# Patient Record
Sex: FEMALE | Race: WHITE | Employment: FULL TIME | URBAN - METROPOLITAN AREA
[De-identification: names, ages, dates, MRNs, and addresses within clinical notes are randomized per-mention and may not be internally consistent; named-entity substitution may affect disease eponyms.]

---

## 2017-01-03 ENCOUNTER — TELEPHONE (OUTPATIENT)
Dept: OBGYN | Facility: CLINIC | Age: 22
End: 2017-01-03

## 2017-01-04 NOTE — TELEPHONE ENCOUNTER
Return call from patient- patient scheduled at [facility] with Herlinda Tom on 1/13/17. Patient pleased.

## 2017-01-13 ENCOUNTER — OFFICE VISIT (OUTPATIENT)
Dept: OBGYN | Facility: CLINIC | Age: 22
End: 2017-01-13
Payer: COMMERCIAL

## 2017-01-13 VITALS
WEIGHT: 138.6 LBS | SYSTOLIC BLOOD PRESSURE: 103 MMHG | HEART RATE: 81 BPM | HEIGHT: 61 IN | DIASTOLIC BLOOD PRESSURE: 64 MMHG | BODY MASS INDEX: 26.17 KG/M2 | TEMPERATURE: 98.7 F

## 2017-01-13 DIAGNOSIS — Z30.46 NEXPLANON REMOVAL: Primary | ICD-10-CM

## 2017-01-13 DIAGNOSIS — Z30.09 BIRTH CONTROL COUNSELING: ICD-10-CM

## 2017-01-13 PROCEDURE — 11982 REMOVE DRUG IMPLANT DEVICE: CPT | Performed by: NURSE PRACTITIONER

## 2017-01-13 PROCEDURE — 99201 ZZC OFFICE/OUTPT VISIT, NEW, LEVEL I: CPT | Mod: 25 | Performed by: NURSE PRACTITIONER

## 2017-01-13 RX ORDER — DESOGESTREL AND ETHINYL ESTRADIOL 0.15-0.03
1 KIT ORAL DAILY
Qty: 84 TABLET | Refills: 1 | Status: SHIPPED | OUTPATIENT
Start: 2017-01-13 | End: 2017-07-06

## 2017-01-13 ASSESSMENT — PAIN SCALES - GENERAL: PAINLEVEL: NO PAIN (0)

## 2017-01-13 NOTE — PROGRESS NOTES
"Meera Salomon is a 21 year old female  No LMP recorded. Patient has had an implant. After 3 years of Nexplanon as a contraceptive, she was here today for its removal. Would like to do something else for contraception. Patient medical, surgical, social, and family history reviewed and updated. ROS: 10 point ROS neg other than the symptoms noted above in the HPI.    Meera was counseled about removal. She voiced her understanding and informed consent was obtained.    /64 mmHg  Pulse 81  Temp(Src) 98.7  F (37.1  C) (Oral)  Ht 5' 1.25\" (1.556 m)  Wt 138 lb 9.6 oz (62.869 kg)  BMI 25.97 kg/m2   This is a well appearing female in no acute distress. Answers questions and maintains eye contact appropriately.     PROCEDURE:  Patient was placed in a supine position. Left arm was flexed at the elbow and externally rotated. The implant was located by palpation and marked at the end closest to the elbow with a sterile marker.     The area was cleaned and antiseptic applied. 1cc of 1% Lidocaine was injected just underneath the end of the implant closest to the elbow.  Light pressure was applied on the end of the implant closest to the elbow and a 2 mm incision was made in the longitudinal direction of the arm at the tip of the implant closest to the elbow. The implant was gently pushed toward the incision until the tip was visible and was grasped with sterile mosquito foreceps and gently removed.  The incision was closed with a butterfly closure and an adhesive bandage applied. A sterile gauze with pressure bandage was also applied.    Aseptic conditions were maintained throughout the procedure. The patient tolerated the procedure well.  No apparent complications. Follow up as needed.  Discussed options for contraception with patient including oral contraceptive pills, transdermal patches, vaginal ring, Depo Provera, Nexplanon. At this time she would like to try an oral contraceptive pill. We discussed when " to start the pill, taking it at the same time every day, possible side effects she may experience, and use of barrier method to protect against STDs. Patient due for AFE and pap smear. Declined completing today. Will plan to return to clinic in the next few months to complete this.    Approximately 10 minutes face to face time was spent with the patient today entirely in education and counseling regarding contraception-this is over and above time spent in the removal procedure.    Herlinda SANDOVAL CNP

## 2017-01-13 NOTE — NURSING NOTE
"Chief Complaint   Patient presents with     Other     Nexplanon removal       Initial /64 mmHg  Pulse 81  Temp(Src) 98.7  F (37.1  C) (Oral)  Ht 5' 1.25\" (1.556 m)  Wt 138 lb 9.6 oz (62.869 kg)  BMI 25.97 kg/m2 Estimated body mass index is 25.97 kg/(m^2) as calculated from the following:    Height as of this encounter: 5' 1.25\" (1.556 m).    Weight as of this encounter: 138 lb 9.6 oz (62.869 kg)..  BP completed using cuff size: regular    Consent obtained today at visit, please see scanned report.         Jennifer Powell CMA        "

## 2017-01-13 NOTE — MR AVS SNAPSHOT
"              After Visit Summary   2017    Meera Salomon    MRN: [id number]           Patient Information     Date Of Birth          1995        Visit Information        Provider Department      2017 10:00 AM Herlinda Tom APRN CNP; [facility] 2 [facility]        Today's Diagnoses     Nexplanon removal    -  1     Birth control counseling            Follow-ups after your visit        Who to contact     If you have questions or need follow up information about today's clinic visit or your schedule please contact [facility] directly at [phone].  Normal or non-critical lab and imaging results will be communicated to you by [employer], letter or phone within 4 business days after the clinic has received the results. If you do not hear from us within 7 days, please contact the clinic through [employer] or phone. If you have a critical or abnormal lab result, we will notify you by phone as soon as possible.  Submit refill requests through [email or web address] or call your pharmacy and they will forward the refill request to us. Please allow 3 business days for your refill to be completed.          Additional Information About Your Visit        MyChart Information     [email or web address] lets you send messages to your doctor, view your test results, renew your prescriptions, schedule appointments and more. To sign up, go to [email or web address] . Click on \"Log in\" on the left side of the screen, which will take you to the Welcome page. Then click on \"Sign up Now\" on the right side of the page.     You will be asked to enter the access code listed below, as well as some personal information. Please follow the directions to create your username and password.     Your access code is: [email or web address]  Expires: 2017 10:44 AM     Your access code will  in 90 days. If you need help or a new code, please call your [facility] or [phone].        Care EveryWhere ID     " "This is your Care EveryWhere ID. This could be used by other organizations to access your [place] medical records  [id number]        Your Vitals Were     Pulse Temperature Height BMI (Body Mass Index)          81 98.7  F (37.1  C) (Oral) 5' 1.25\" (1.556 m) 25.97 kg/m2         Blood Pressure from Last 3 Encounters:   01/13/17 103/64    Weight from Last 3 Encounters:   01/13/17 138 lb 9.6 oz (62.869 kg)              We Performed the Following     REMOVAL NON-BIODEGRADABLE DRUG DELIVERY IMPLANT          Today's Medication Changes          These changes are accurate as of: 1/13/17 10:44 AM.  If you have any questions, ask your nurse or doctor.               Start taking these medicines.        Dose/Directions    desogestrel-ethinyl estradiol 0.15-30 MG-MCG per tablet   Commonly known as:  APRI   Used for:  Birth control counseling   Started by:  Herlinda Tom APRN CNP        Dose:  1 tablet   Take 1 tablet by mouth daily   Quantity:  84 tablet   Refills:  1            Where to get your medicines      These medications were sent to [facility] - [place], [place] - [address]  [address] [place] [address]     Phone:  [phone]    - desogestrel-ethinyl estradiol 0.15-30 MG-MCG per tablet             Primary Care Provider    None Specified       No primary provider on file.        Thank you!     Thank you for choosing [facility]  [facility]. Our goal is always to provide you with excellent care. Hearing back from our patients is one way we can continue to improve our services. Please take a few minutes to complete the written survey that you may receive in the mail after your visit with us. Thank you!             Your Updated Medication List - Protect others around you: Learn how to safely use, store and throw away your medicines at www.disposemymeds.org.          This list is accurate as of: 1/13/17 10:44 AM.  Always use your most " recent med list.                   Brand Name Dispense Instructions for use    desogestrel-ethinyl estradiol 0.15-30 MG-MCG per tablet    APRI    84 tablet    Take 1 tablet by mouth daily       etonogestrel 68 MG Impl    IMPLANON/NEXPLANON     1 each by Subdermal route once

## 2017-07-06 DIAGNOSIS — Z30.09 BIRTH CONTROL COUNSELING: ICD-10-CM

## 2017-07-06 NOTE — TELEPHONE ENCOUNTER
desogestrel-ethinyl estradiol (APRI) 0.15-30 MG-MCG per tablet 84 tablet 1 1/13/2017  --   Sig: Take 1 tablet by mouth daily     Last Office Visit with [facility] or [facility] prescribing provider: 01-13-17 with LORI Ryan, CNP for Nexplanon removal. Notes as below:  Patient due for AFE and pap smear. Declined completing today. Will plan to return to clinic in the next few months to complete this    No future appt scheduled.                                          Patient needs to be seen by provider   Is there a time frame in which the patient should be seen Yes: as soon as possible  What does the patient need to be seen regarding WWE   Does patient need to come fasting No    When workflow completed Route to care team    Clinic: [facility] at [phone]    Was first outreach attempted?Yes: left message for patient to call back to schedule an appt.   Sruthi Hall RN, BAN

## 2017-07-06 NOTE — TELEPHONE ENCOUNTER
Reason for Call:  Medication or medication refill:    Do you use a [facility]?  Name of the pharmacy and phone number for the current request:  [facility]     Name of the medication requested: desogestrel-ethinyl estradiol (APRI) 0.15-30 MG-MCG per tablet    Other request:     Can we leave a detailed message on this number? YES    Phone number patient can be reached at: Home number on file [phone] (home)    Best Time:     Call taken on 7/6/2017 at 2:04 PM by Fern Stevens

## 2017-07-07 NOTE — TELEPHONE ENCOUNTER
Patient would like a refill on this medication until she sees you next Thursday.  She needs this by Sunday.  Please call and discuss this as she requests.

## 2017-07-10 RX ORDER — DESOGESTREL AND ETHINYL ESTRADIOL 0.15-0.03
1 KIT ORAL DAILY
Qty: 28 TABLET | Refills: 0 | Status: SHIPPED | OUTPATIENT
Start: 2017-07-10 | End: 2017-07-13

## 2017-07-10 NOTE — TELEPHONE ENCOUNTER
Prescription approved per [facility].  Left message for patient that Rx was sent to pharmacy. Verified appt details as below:  Next 5 appointments (look out 90 days)     Jul 13, 2017  9:30 AM CDT   Office Visit with LORI Campa [facility]    [address] [facility] [address]   [phone]                Sruthi Hall RN, BAN

## 2017-07-13 ENCOUNTER — OFFICE VISIT (OUTPATIENT)
Dept: OBGYN | Facility: CLINIC | Age: 22
End: 2017-07-13
Payer: COMMERCIAL

## 2017-07-13 VITALS
TEMPERATURE: 97.8 F | BODY MASS INDEX: 26.43 KG/M2 | HEIGHT: 62 IN | DIASTOLIC BLOOD PRESSURE: 69 MMHG | HEART RATE: 96 BPM | SYSTOLIC BLOOD PRESSURE: 102 MMHG | WEIGHT: 143.6 LBS

## 2017-07-13 DIAGNOSIS — Z30.41 ENCOUNTER FOR SURVEILLANCE OF CONTRACEPTIVE PILLS: ICD-10-CM

## 2017-07-13 DIAGNOSIS — Z01.419 ENCOUNTER FOR GYNECOLOGICAL EXAMINATION WITHOUT ABNORMAL FINDING: Primary | ICD-10-CM

## 2017-07-13 DIAGNOSIS — Z11.3 SCREENING EXAMINATION FOR VENEREAL DISEASE: ICD-10-CM

## 2017-07-13 PROCEDURE — 99395 PREV VISIT EST AGE 18-39: CPT | Performed by: NURSE PRACTITIONER

## 2017-07-13 PROCEDURE — 87591 N.GONORRHOEAE DNA AMP PROB: CPT | Performed by: NURSE PRACTITIONER

## 2017-07-13 PROCEDURE — G0145 SCR C/V CYTO,THINLAYER,RESCR: HCPCS | Performed by: NURSE PRACTITIONER

## 2017-07-13 PROCEDURE — 87491 CHLMYD TRACH DNA AMP PROBE: CPT | Performed by: NURSE PRACTITIONER

## 2017-07-13 RX ORDER — DESOGESTREL AND ETHINYL ESTRADIOL 0.15-0.03
1 KIT ORAL DAILY
Qty: 84 TABLET | Refills: 3 | Status: SHIPPED | OUTPATIENT
Start: 2017-07-13 | End: 2018-11-15

## 2017-07-13 ASSESSMENT — PAIN SCALES - GENERAL: PAINLEVEL: NO PAIN (0)

## 2017-07-13 NOTE — NURSING NOTE
"Chief Complaint   Patient presents with     Physical       Initial /69  Pulse 96  Temp 97.8  F (36.6  C) (Oral)  Ht 5' 2\" (1.575 m)  Wt 143 lb 9.6 oz (65.1 kg)  LMP 07/08/2017 (Exact Date)  BMI 26.26 kg/m2 Estimated body mass index is 26.26 kg/(m^2) as calculated from the following:    Height as of this encounter: 5' 2\" (1.575 m).    Weight as of this encounter: 143 lb 9.6 oz (65.1 kg)..  BP completed using cuff size: urban Powell CMA    "

## 2017-07-13 NOTE — PROGRESS NOTES
S: Pt is a 22 year old  0 para 0 who presents today for an annual female exam. LMP: 17. Contraception: Pill.   Amenable to STD screening. Last Pap smear: no previous. Immunizations discussed. Dental Exams: due. Diet and calcium reviewed. Exercise: nothing regular.    Past Medical History:   Diagnosis Date     NO ACTIVE PROBLEMS      Past Surgical History:   Procedure Laterality Date     NO HISTORY OF SURGERY       Social History   Substance Use Topics     Smoking status: Never Smoker     Smokeless tobacco: Never Used     Alcohol use 0.0 oz/week     0 Standard drinks or equivalent per week         REVIEW OF SYSTEMS:  CONSTITUTIONAL:NEGATIVE for fever, chills, change in weight  EYES: NEGATIVE for vision changes or irritation  ENT/MOUTH: NEGATIVE for ear, mouth and throat problems  RESP:NEGATIVE for significant cough or SOB  CV: NEGATIVE for chest pain, palpitations or peripheral edema  GI: NEGATIVE for nausea, abdominal pain, heartburn, or change in bowel habits  Periods are regular q 28-30 days, Cyclic symptoms include none. No intermenstrual bleeding.  MUSCULOSKELATAL:NEGATIVE for significant arthralgias or myalgia  INTEGUMENTARY/SKIN: NEGATIVE for worrisome rashes, moles or lesions  NEURO: NEGATIVE for weakness, dizziness or paresthesias  ENDOCRINE: NEGATIVE for temperature intolerance, skin/hair changes  HEME/ALLERGY/IMMUNE: NEGATIVE for bleeding problems  PSYCHIATRIC: NEGATIVE for changes in mood or affect      OBJECTIVE: This is a well appearing female in no acute distress. Answers questions and maintains eye contact appropriately. Vital signs noted.     EXAM:  EYES: Eyes grossly normal to inspection, PERRL and conjunctivae and sclerae normal  HENT: ear canals and TM's normal and nose and mouth without ulcers or lesions  NECK: no adenopathy, no asymmetry, masses, or scars and thyroid normal to palpation  RESP: lungs clear to auscultation - no rales, rhonchi or wheezes  CV: regular rates and rhythm,  normal S1 S2, no S3 or S4 and no murmur, click or rub  LYMPH: normal ant/post cervical and supraclavicular nodes  ABD/GI: soft, nontender, without hepatosplenomegaly or masses  MS: extremities normal- no gross deformities noted  SKIN: no suspicious lesions or rashes  NEURO: Normal strength and tone, mentation intact and speech normal  PSYCH: mentation appears normal and affect normal/bright  Breast exam: Breasts are symmetrical without masses, lymphadenopathy, retraction, dimpling, or nipple discharge bilaterally.  Pelvic Exam: External genitalia without visible lesions or discharge. Normal BUS. Vaginal mucosa pink, rugated, moist, without lesions or discharge. Cervix is pink, nulliparous, midline, without cervical motion tenderness. Pap smear is obtained. Uterus normal size and shape without tenderness or masses. Adnexa without masses or tenderness bilaterally.    A/P:  1) Normal annual female exam. Health maintenance updated. Regular physical activity and healthy diet encouraged. Continue regular dental exams. Encouraged adequate calcium intake.  2) Contraception renewal. Refill oral contraceptive pills for 1 year.  3) STD screening. Labs obtained and we will notify patient of results when available.    Herlinda SANDOVAL CNP  I have reviewed this encounter and agree.  John Paul Olguin MD FACOG

## 2017-07-13 NOTE — MR AVS SNAPSHOT
"              After Visit Summary   2017    Meera Salomon    MRN: [id number]           Patient Information     Date Of Birth          1995        Visit Information        Provider Department      2017 9:30 AM Herlinda Tom APRN CNP [facility]        Today's Diagnoses     Encounter for gynecological examination without abnormal finding    -  1    Screening examination for venereal disease        Encounter for surveillance of contraceptive pills           Follow-ups after your visit        Who to contact     If you have questions or need follow up information about today's clinic visit or your schedule please contact [facility] directly at [phone].  Normal or non-critical lab and imaging results will be communicated to you by MyChart, letter or phone within 4 business days after the clinic has received the results. If you do not hear from us within 7 days, please contact the clinic through MyChart or phone. If you have a critical or abnormal lab result, we will notify you by phone as soon as possible.  Submit refill requests through [employer] or call your pharmacy and they will forward the refill request to us. Please allow 3 business days for your refill to be completed.          Additional Information About Your Visit        MyChart Information     [employer] lets you send messages to your doctor, view your test results, renew your prescriptions, schedule appointments and more. To sign up, go to [email or web address] . Click on \"Log in\" on the left side of the screen, which will take you to the Welcome page. Then click on \"Sign up Now\" on the right side of the page.     You will be asked to enter the access code listed below, as well as some personal information. Please follow the directions to create your username and password.     Your access code is: [email or web address]  Expires: 10/11/2017  9:42 AM     Your access code will  in 90 days. If you need help or a " "new code, please call your [place] clinic or [phone].        Care EveryWhere ID     This is your Care EveryWhere ID. This could be used by other organizations to access your [place] medical records  [id number]        Your Vitals Were     Pulse Temperature Height Last Period BMI (Body Mass Index)       96 97.8  F (36.6  C) (Oral) 5' 2\" (1.575 m) 07/08/2017 (Exact Date) 26.26 kg/m2        Blood Pressure from Last 3 Encounters:   07/13/17 102/69   01/13/17 103/64    Weight from Last 3 Encounters:   07/13/17 143 lb 9.6 oz (65.1 kg)   01/13/17 138 lb 9.6 oz (62.9 kg)              We Performed the Following     Chlamydia trachomatis PCR     Neisseria gonorrhoeae PCR     Pap imaged thin layer screen only - recommended age 21 - 24 years          Today's Medication Changes          These changes are accurate as of: 7/13/17  9:42 AM.  If you have any questions, ask your nurse or doctor.               These medicines have changed or have updated prescriptions.        Dose/Directions    desogestrel-ethinyl estradiol 0.15-30 MG-MCG per tablet   Commonly known as:  APRI   This may have changed:  additional instructions   Used for:  Encounter for surveillance of contraceptive pills   Changed by:  Herlinda Tom APRN CNP        Dose:  1 tablet   Take 1 tablet by mouth daily   Quantity:  84 tablet   Refills:  3            Where to get your medicines      These medications were sent to [facility] [address] [place] [address] [facility] AT [address]  [address] [facility] [address]     Phone:  [phone]     desogestrel-ethinyl estradiol 0.15-30 MG-MCG per tablet                Primary Care Provider    None Specified       No primary provider on file.        Equal Access to Services     REESE GARCIA : Sheila Gibbs, wameenada mason, qaybta kaalmada deb, tianna dickinson. So [facility] [phone].    ATENCIÓN: Si cherelle monson " disposición servicios gratuitos de asistencia lingüística. Theodore suarez [phone].    We comply with applicable federal civil rights laws and [place] laws. We do not discriminate on the basis of race, color, national origin, age, disability sex, sexual orientation or gender identity.            Thank you!     Thank you for choosing [facility]  [facility]. Our goal is always to provide you with excellent care. Hearing back from our patients is one way we can continue to improve our services. Please take a few minutes to complete the written survey that you may receive in the mail after your visit with us. Thank you!             Your Updated Medication List - Protect others around you: Learn how to safely use, store and throw away your medicines at www.disposemymeds.org.          This list is accurate as of: 7/13/17  9:42 AM.  Always use your most recent med list.                   Brand Name Dispense Instructions for use Diagnosis    desogestrel-ethinyl estradiol 0.15-30 MG-MCG per tablet    APRI    84 tablet    Take 1 tablet by mouth daily    Encounter for surveillance of contraceptive pills

## 2017-07-13 NOTE — LETTER
July 28, 2017      Meera Salomon  [address] [address] [facility] [address]    Dear ,      I am happy to inform you that your recent cervical cancer screening test (PAP smear) was normal.      Preventative screenings such as this help to ensure your health for years to come. You should repeat a pap smear in 3 years, unless otherwise directed.      You will still need to return to the clinic every year for your annual exam and other preventive tests.     Please contact the clinic at [phone] if you have further questions.       Sincerely,      LORI Campa CNP/esh

## 2017-07-13 NOTE — LETTER
[facility]  [address] [address]  [phone]      July 14, 2017      Meera Salomon  [address] [address] [address]              Meera,     Your labs are negative for infection. Please follow up as needed.       Component      Latest Ref Rng & Units 7/13/2017   Specimen Description       Cervical   Chlamydia Trachomatis PCR      NEG Negative . . .   Specimen Descrip       Cervical   N Gonorrhea PCR      NEG Negative . . .         Sincerely,     Herlinda Tom CNP

## 2017-07-14 LAB
C TRACH DNA SPEC QL NAA+PROBE: NORMAL
N GONORRHOEA DNA SPEC QL NAA+PROBE: NORMAL
SPECIMEN SOURCE: NORMAL
SPECIMEN SOURCE: NORMAL

## 2017-07-17 LAB
COPATH REPORT: NORMAL
PAP: NORMAL

## 2017-08-06 DIAGNOSIS — Z30.09 BIRTH CONTROL COUNSELING: ICD-10-CM

## 2017-08-07 RX ORDER — DESOGESTREL AND ETHINYL ESTRADIOL 0.15-0.03
KIT ORAL
Qty: 28 TABLET | Refills: 0 | OUTPATIENT
Start: 2017-08-07

## 2018-11-13 ENCOUNTER — TELEPHONE (OUTPATIENT)
Dept: OBGYN | Facility: CLINIC | Age: 23
End: 2018-11-13

## 2018-11-13 NOTE — TELEPHONE ENCOUNTER
Patient returned call.  She will return call tomorrow if she does not hear from [facility].  Please advise.

## 2018-11-15 ENCOUNTER — OFFICE VISIT (OUTPATIENT)
Dept: OBGYN | Facility: CLINIC | Age: 23
End: 2018-11-15
Payer: COMMERCIAL

## 2018-11-15 VITALS
BODY MASS INDEX: 27.97 KG/M2 | OXYGEN SATURATION: 97 % | HEIGHT: 62 IN | WEIGHT: 152 LBS | TEMPERATURE: 97.3 F | HEART RATE: 82 BPM | SYSTOLIC BLOOD PRESSURE: 103 MMHG | DIASTOLIC BLOOD PRESSURE: 65 MMHG

## 2018-11-15 DIAGNOSIS — Z30.017 NEXPLANON INSERTION: ICD-10-CM

## 2018-11-15 DIAGNOSIS — Z30.09 BIRTH CONTROL COUNSELING: Primary | ICD-10-CM

## 2018-11-15 PROCEDURE — 11981 INSERTION DRUG DLVR IMPLANT: CPT | Performed by: NURSE PRACTITIONER

## 2018-11-15 PROCEDURE — 99212 OFFICE O/P EST SF 10 MIN: CPT | Mod: 25 | Performed by: NURSE PRACTITIONER

## 2018-11-15 ASSESSMENT — PAIN SCALES - GENERAL: PAINLEVEL: NO PAIN (0)

## 2018-11-15 NOTE — MR AVS SNAPSHOT
"              After Visit Summary   11/15/2018    Meera Salomon    MRN: [id number]           Patient Information     Date Of Birth          1995        Visit Information        Provider Department      11/15/2018 10:15 AM Herlinda Tom APRN CNP; [place] PROCEDURE ROOM 2 [facility]        Today's Diagnoses     Birth control counseling    -  1    Nexplanon insertion           Follow-ups after your visit        Who to contact     If you have questions or need follow up information about today's clinic visit or your schedule please contact [facility] directly at [phone].  Normal or non-critical lab and imaging results will be communicated to you by [employer], letter or phone within 4 business days after the clinic has received the results. If you do not hear from us within 7 days, please contact the clinic through [employer] or phone. If you have a critical or abnormal lab result, we will notify you by phone as soon as possible.  Submit refill requests through [employer] or call your pharmacy and they will forward the refill request to us. Please allow 3 business days for your refill to be completed.          Additional Information About Your Visit        MyChart Information     [employer] lets you send messages to your doctor, view your test results, renew your prescriptions, schedule appointments and more. To sign up, go to [email or web address] . Click on \"Log in\" on the left side of the screen, which will take you to the Welcome page. Then click on \"Sign up Now\" on the right side of the page.     You will be asked to enter the access code listed below, as well as some personal information. Please follow the directions to create your username and password.     Your access code is: [email or web address]  Expires: 2019 10:49 AM     Your access code will  in 90 days. If you need help or a new code, please call your [facility] or [phone].        Care EveryWhere ID     " "This is your Care EveryWhere ID. This could be used by other organizations to access your [place] medical records  [id number]        Your Vitals Were     Pulse Temperature Height Last Period Pulse Oximetry BMI (Body Mass Index)    82 97.3  F (36.3  C) (Oral) 5' 2.25\" (1.581 m) 11/14/2018 (Exact Date) 97% 27.58 kg/m2       Blood Pressure from Last 3 Encounters:   11/15/18 103/65   07/13/17 102/69   01/13/17 103/64    Weight from Last 3 Encounters:   11/15/18 152 lb (68.9 kg)   07/13/17 143 lb 9.6 oz (65.1 kg)   01/13/17 138 lb 9.6 oz (62.9 kg)              We Performed the Following     ETONOGESTREL IMPLANT SYSTEM     INSERTION NON-BIODEGRADABLE DRUG DELIVERY IMPLANT          Today's Medication Changes          These changes are accurate as of 11/15/18 10:49 AM.  If you have any questions, ask your nurse or doctor.               Start taking these medicines.        Dose/Directions    etonogestrel 68 MG Impl   Commonly known as:  IMPLANON/NEXPLANON   Used for:  Nexplanon insertion   Started by:  Herlinda Tom APRN CNP        Dose:  1 each   1 each (68 mg) by Subdermal route continuous   Refills:  0         Stop taking these medicines if you haven't already. Please contact your care team if you have questions.     desogestrel-ethinyl estradiol 0.15-30 MG-MCG per tablet   Commonly known as:  APRI   Stopped by:  Herlinda Tom APRN CNP                Where to get your medicines      Some of these will need a paper prescription and others can be bought over the counter.  Ask your nurse if you have questions.     You don't need a prescription for these medications     etonogestrel 68 MG Impl                Primary Care Provider Fax #    Physician No Ref-Primary [phone]       No address on file        Equal Access to Services     DELGADO GARCIA : Sheila Gibbs, waaxda luqadaha, qaybta kaalmada adeegyacameron, tianna dickinson. So [facility] [phone].    ATENCIÓN: Si " fany adhikari, tiene a diane disposición servicios gratuitos de asistencia lingüística. Theodore suarez [phone].    We comply with applicable federal civil rights laws and [place] laws. We do not discriminate on the basis of race, color, national origin, age, disability, sex, sexual orientation, or gender identity.            Thank you!     Thank you for choosing [facility]  [facility]. Our goal is always to provide you with excellent care. Hearing back from our patients is one way we can continue to improve our services. Please take a few minutes to complete the written survey that you may receive in the mail after your visit with us. Thank you!             Your Updated Medication List - Protect others around you: Learn how to safely use, store and throw away your medicines at www.disposemymeds.org.          This list is accurate as of 11/15/18 10:49 AM.  Always use your most recent med list.                   Brand Name Dispense Instructions for use Diagnosis    etonogestrel 68 MG Impl    IMPLANON/NEXPLANON     1 each (68 mg) by Subdermal route continuous    Nexplanon insertion

## 2018-11-15 NOTE — NURSING NOTE
"Chief Complaint   Patient presents with     Procedure     nexplanon insertion       Initial /65  Pulse 82  Temp 97.3  F (36.3  C) (Oral)  Ht 5' 2.25\" (1.581 m)  Wt 152 lb (68.9 kg)  LMP 11/14/2018 (Exact Date)  SpO2 97%  BMI 27.58 kg/m2 Estimated body mass index is 27.58 kg/(m^2) as calculated from the following:    Height as of this encounter: 5' 2.25\" (1.581 m).    Weight as of this encounter: 152 lb (68.9 kg)..  BP completed using cuff size: regular    Consent obtained today at visit, please see scanned report.         Jennifer Powell CMA      "

## 2018-11-15 NOTE — LETTER
"                                                                          Affirmation of Informed Consent for Surgery or Invasive Procedure    1.  I, (print patient's name) Meera Salomon,  1995,   a.  Agree that I will have (include both the medical term and patient words):           Chief Complaint   Patient presents with     Procedure     nexplanon insertion      b.  At [facility].     c.  The reason for this procedure is (medical condition):  contraception.   d.  This will be done or supervised by:  LORI Campa CNP.   e.  My doctor may have help from others.  Help could include opening or closing         the wound. Help might also include taking grafts, cutting out tissue,                           implanting devices.  I have been told who will help, if known.     2.  I have talked to my doctor or health care team about:   a.  What the procedure is and what will happen.   b   How it may help me (the benefits).   c.  How it may harm me (the most likely and most serious risks).   d.  The long-term effects the procedure might have.    e.  My other choices for treatment.  The risks and benefits of those choices.    f.   What will likely happen if I say \"no\" to this procedure.    g.  How I might feel right after and how quickly I might be expected to recover.      h.  What medicines will be used to manage pain or sedate me.     3.  I agree that:  (If I do not agree with a statement, I have crossed it out and              initialed next to it.)       a.  I will ask questions.     b.  No one has promised me definite results.    c.  If serious problems are found during the procedure, the treatment may                    change.   d.  If I have \"do not resuscitate\" (DNR) wishes, I have discussed this with my                              doctor and they will be put on hold during the procedure.   e. Students and other appropriate people, approved by the facility, may watch                     "  the procedure and help with tasks they are qualified for.                                                    f.  Pictures or videos may be taken. They may be used for medical or                  educational reasons only.       g. Tissues or organs removed from my body as part of the normal course of the                    procedure may be used for research or teaching purposes. They will be                  disposed of with respect.                    h.  If a staff person is exposed to my blood or body fluids, my blood will be drawn                   and tested for HIV and hepatitis.  I understand that by law, the test results will                    go:         -  In my medical record.                         -  To the Employee Occupational Health Service and/or Infection Control                                  at this facility.    -  To the [place] [employer] officials.                 i.  I may have a blood transfusion: I have talked to my doctor or care team about:    -  Why I may need a blood transfusion.     - The risks, benefits, and side effects of transfusion - and the risks of not        Having one.     -  Blood safety and other options for treatment.        Consent for blood transfusion obtained during a hospital admission is valid for the entire hospital stay.  Consent  for blood transfusion obtained in the clinic setting is valid for a year from the signature date.                                4.  I understand that:   a.  I can change my mind.  If I do, I must tell my doctor or team as soon as                           possible.              b.  The team members may change during the procedure.                c.   The team will double-check who I am.  They will ask me what I am having                         done and the site of the site of the procedure.  This is done for my safety.    My questions have been answered.  I agree to the procedure.  I have made my special needs and instructions  known.      Meera Salomon      11/15/2018 10:23 AM  Patient (or representative)/Relationship to patient             Date  Time    For telephone consent:      11/15/2018  10:23 AM         Date  Time  Print name of patient (or representative)/relationship to patient    Witness:  I have verified that the signature is that of the patient or patient's representative and that this has been signed before the procedure:    NAME:        11/15/2018  10:23 AM         Date  Time  Person verifying patient's name or patient representative's signature     Provider:  I have discussed the procedure and the information stated above with the patient (or the patient's representative) and answered their questions. The patient or their representative consented to the procedure:      LORI Campa CNP    11/15/2018  10:23 AM  Physician or Provider's Signature(s)   Date  Time       Intepreter (if used):       11/15/2018  10:23 AM                                   Name       Language/Organization Date  Time    Consent for procedure valid for 30 days after patient signature date     [facility]  AFFIRMATION OF INFORMED CONSENT FOR SURGERY OR INVASIVE PROCEDURE               Original - Medical Records

## 2018-11-15 NOTE — PROGRESS NOTES
"  SUBJECTIVE:   Meera Salomon is a 23 year old female who presents to clinic today for the following health issues:    Discuss contraception    Patient had Nexplanon removed in January 2017. Has been on oral contraceptive pills since then. Would like a new implant inserted, but has questions on other options as well before we proceed with the insertion. Menses began yesterday, stopped oral contraceptive pills this past weekend.  Due for an annual exam, declines completing this today.     Problem list and histories reviewed & adjusted, as indicated.  Additional history: as documented    There is no problem list on file for this patient.    Past Surgical History:   Procedure Laterality Date     NO HISTORY OF SURGERY         Social History   Substance Use Topics     Smoking status: Never Smoker     Smokeless tobacco: Never Used     Alcohol use 0.0 oz/week     0 Standard drinks or equivalent per week     Family History   Problem Relation Age of Onset     KIDNEY DISEASE Maternal Grandmother      Diabetes Maternal Grandfather      Heart Failure Maternal Grandfather      Lung Cancer Paternal Grandfather            Reviewed and updated as needed this visit by clinical staff  Tobacco  Allergies  Meds  Problems  Med Hx  Surg Hx  Fam Hx  Soc Hx        Reviewed and updated as needed this visit by Provider  Tobacco  Allergies  Meds  Problems  Med Hx  Surg Hx  Fam Hx  Soc Hx          ROS:  Constitutional, HEENT, cardiovascular, pulmonary, gi and gu systems are negative, except as otherwise noted.    OBJECTIVE:     /65  Pulse 82  Temp 97.3  F (36.3  C) (Oral)  Ht 5' 2.25\" (1.581 m)  Wt 152 lb (68.9 kg)  LMP 11/14/2018 (Exact Date)  SpO2 97%  BMI 27.58 kg/m2  Body mass index is 27.58 kg/(m^2).  GENERAL: healthy, alert and no distress  RESP: lungs clear to auscultation - no rales, rhonchi or wheezes  CV: regular rate and rhythm, normal S1 S2, no S3 or S4, no murmur, click or rub, no peripheral edema and " peripheral pulses strong  ABDOMEN: soft, nontender, no hepatosplenomegaly, no masses and bowel sounds normal  MS: no gross musculoskeletal defects noted, no edema  SKIN: no suspicious lesions or rashes  PSYCH: mentation appears normal, affect normal/bright    ASSESSMENT/PLAN:   1. Birth control counseling  Discussed options for contraception with patient including oral contraceptive pills, transdermal patches, vaginal ring, Depo Provera, Nexplanon, IUD. After reviewing options, does want to proceed with Nexplanon insert, requests this be completed today.    LORI [facility]

## 2018-11-15 NOTE — PROGRESS NOTES
"Meera Salomon is a 23 year old year old female  Patient's last menstrual period was 2018 (exact date). here for Nexplanon insertion.    I have reviewed options regarding contraception, and again today reviewed the nexplanon as a sub-dermal implant effectiveness for up to three years. Reviewed risk of migration of the implant into a vascular space and possibility of difficult removal, surgical removal.  Patient medical, surgical, social, and family history reviewed and updated. ROS: 10 point ROS neg other than the symptoms noted above in the HPI.    We reviewed the mechanism of actions, goals and limitations of the use of the medication, as well as the contraindications.  Bleeding patterns were also reviewed with her. She voiced her understanding.  The patient and I reviewed nexplanon removal. Patient consent form is signed.    /65  Pulse 82  Temp 97.3  F (36.3  C) (Oral)  Ht 5' 2.25\" (1.581 m)  Wt 152 lb (68.9 kg)  LMP 2018 (Exact Date)  SpO2 97%  BMI 27.58 kg/m2 LMP 18.  This is a well appearing female in no acute distress. Answers questions and maintains eye contact appropriately.     Patient was placed in a supine position. Left arm was flexed at the elbow and externally rotated. Insertion site was noted at 6 cm above the elbow crease at the inner side of the upper arm overlying the grove between the biceps and the triceps. A second trinh was made 2 cm from the first to use as a guide. The area of the insertion site was prepped with Betadine. Lidocaine 1% was used along the planned insertion site. The nexplanon was removed from its pack. The implant was visually verified. The skin was stretched at the insertion site. The needle was inserted with beveled side up just underneath the skin. Tenting was undertaken while the insertion was being performed. The seal of the applicator was broken and the cannula was retracted. After the insertion, the implant was palpated by both " myself and the patient. The betadine was removed from her arm. Telfa was applied to site, along with pressure dressing and sterile gauze.    The patient was given aftercare instructions, her user card with the lot number. The patient tolerated the procedure well.  No apparent complications.      Lot#:[id number]  Expiration date:02/2021  NDC#: 2210-7655-46    Herlinda SANDOVAL CNP    I have reviewed this encounter and agree.  John Paul Olguin MD FACOG

## 2021-01-20 ENCOUNTER — OFFICE VISIT (OUTPATIENT)
Dept: OBGYN | Facility: CLINIC | Age: 26
End: 2021-01-20

## 2021-01-20 VITALS
BODY MASS INDEX: 30.36 KG/M2 | DIASTOLIC BLOOD PRESSURE: 77 MMHG | HEART RATE: 96 BPM | WEIGHT: 160.8 LBS | SYSTOLIC BLOOD PRESSURE: 116 MMHG | TEMPERATURE: 98.2 F | OXYGEN SATURATION: 100 % | HEIGHT: 61 IN

## 2021-01-20 DIAGNOSIS — Z30.011 ENCOUNTER FOR INITIAL PRESCRIPTION OF CONTRACEPTIVE PILLS: ICD-10-CM

## 2021-01-20 DIAGNOSIS — Z30.46 NEXPLANON REMOVAL: Primary | ICD-10-CM

## 2021-01-20 PROCEDURE — 99213 OFFICE O/P EST LOW 20 MIN: CPT | Mod: 25 | Performed by: NURSE PRACTITIONER

## 2021-01-20 PROCEDURE — 11982 REMOVE DRUG IMPLANT DEVICE: CPT | Performed by: NURSE PRACTITIONER

## 2021-01-20 RX ORDER — DESOGESTREL AND ETHINYL ESTRADIOL 0.15-0.03
1 KIT ORAL DAILY
Qty: 84 TABLET | Refills: 3 | Status: SHIPPED | OUTPATIENT
Start: 2021-01-20

## 2021-01-20 ASSESSMENT — MIFFLIN-ST. JEOR: SCORE: 1411.76

## 2021-01-20 ASSESSMENT — PAIN SCALES - GENERAL: PAINLEVEL: NO PAIN (0)

## 2021-01-20 NOTE — PROGRESS NOTES
"Meera Salomon is a 25 year old female  No LMP recorded. Patient has had an implant. After 2.5 years of Nexplanon as a contraceptive, she was here today for its removal. Desires to restart on a combined oral contraceptive pill. Patient medical, surgical, social, and family history reviewed and updated. ROS: 10 point ROS neg other than the symptoms noted above in the HPI.    Meera was counseled about removal. She voiced her understanding and informed consent was obtained.    /77 (BP Location: Right arm, Patient Position: Sitting, Cuff Size: Adult Regular)   Pulse 96   Temp 98.2  F (36.8  C) (Tympanic)   Ht 1.549 m (5' 1\")   Wt 72.9 kg (160 lb 12.8 oz)   SpO2 100%   BMI 30.38 kg/m     This is a well appearing female in no acute distress. Answers questions and maintains eye contact appropriately.     PROCEDURE:  Patient was placed in a supine position. Left arm was flexed at the elbow and externally rotated. The implant was located by palpation and marked at the end closest to the elbow with a sterile marker.     The area was cleaned and antiseptic applied. 1cc of 1% Lidocaine was injected just underneath the end of the implant closest to the elbow.  Light pressure was applied on the end of the implant closest to the elbow and a 2 mm incision was made in the longitudinal direction of the arm at the tip of the implant closest to the elbow. The implant was gently pushed toward the incision until the tip was visible and was grasped with sterile mosquito foreceps and gently removed.  The incision was closed with a butterfly closure and an adhesive bandage applied. A sterile gauze with pressure bandage was also applied.    Aseptic conditions were maintained throughout the procedure. The patient tolerated the procedure well.  No apparent complications. Follow up as needed.  Prescription sent for oral contraceptive pill, discussed when to start the pill, taking it at the same time every day, possible " side effects she may experience, and use of barrier method to protect against STDs.   Recommend she return to clinic in the next few months for preventive exam and pap smear.     Herlinda SANDOVAL CNP

## 2025-08-25 ENCOUNTER — TRANSCRIBE ORDERS (OUTPATIENT)
Dept: MATERNAL FETAL MEDICINE | Facility: CLINIC | Age: 30
End: 2025-08-25

## 2025-08-25 ENCOUNTER — MEDICAL CORRESPONDENCE (OUTPATIENT)
Dept: HEALTH INFORMATION MANAGEMENT | Facility: CLINIC | Age: 30
End: 2025-08-25

## 2025-08-25 ENCOUNTER — LAB REQUISITION (OUTPATIENT)
Dept: LAB | Facility: CLINIC | Age: 30
End: 2025-08-25
Payer: MEDICAID

## 2025-08-25 DIAGNOSIS — Z32.01 PREGNANCY TEST POSITIVE: Primary | ICD-10-CM

## 2025-08-25 DIAGNOSIS — O09.90 SUPERVISION OF HIGH RISK PREGNANCY, UNSPECIFIED, UNSPECIFIED TRIMESTER: ICD-10-CM

## 2025-08-25 DIAGNOSIS — O26.90 PREGNANCY RELATED CONDITION, ANTEPARTUM: Primary | ICD-10-CM

## 2025-08-25 LAB
ABO + RH BLD: NORMAL
BLD GP AB SCN SERPL QL: NEGATIVE
HBV SURFACE AB SERPL IA-ACNC: 11.6 M[IU]/ML
HBV SURFACE AB SERPL IA-ACNC: REACTIVE M[IU]/ML
HBV SURFACE AG SERPL QL IA: NONREACTIVE
HCV AB SERPL QL IA: NONREACTIVE
RUBV IGG SERPL QL IA: 4.5 INDEX
RUBV IGG SERPL QL IA: POSITIVE
SPECIMEN EXP DATE BLD: NORMAL
T PALLIDUM AB SER QL: NONREACTIVE
VIT D+METAB SERPL-MCNC: 39 NG/ML (ref 20–50)
VZV IGG SER QL IA: 1.49 S/CO
VZV IGG SER QL IA: POSITIVE

## 2025-08-27 LAB
HGB A1 MFR BLD: 95.6 %
HGB A2 MFR BLD: 3.3 %
HGB C MFR BLD: 0 %
HGB E MFR BLD: 0 %
HGB F MFR BLD: 1.1 %
HGB FRACT BLD ELPH-IMP: NORMAL
HGB OTHER MFR BLD: 0 %
HGB S BLD QL SOLY: NORMAL
HGB S MFR BLD: 0 %
PATH INTERP BLD-IMP: NORMAL

## 2025-09-02 ENCOUNTER — PRE VISIT (OUTPATIENT)
Dept: MATERNAL FETAL MEDICINE | Facility: CLINIC | Age: 30
End: 2025-09-02

## 2025-09-02 ENCOUNTER — HOSPITAL ENCOUNTER (OUTPATIENT)
Dept: ULTRASOUND IMAGING | Facility: CLINIC | Age: 30
Discharge: HOME | End: 2025-09-02
Attending: MIDWIFE
Payer: MEDICAID

## 2025-09-02 PROCEDURE — 76801 OB US < 14 WKS SINGLE FETUS: CPT | Mod: 26 | Performed by: RADIOLOGY

## 2025-09-02 PROCEDURE — 76817 TRANSVAGINAL US OBSTETRIC: CPT | Mod: 26 | Performed by: RADIOLOGY

## 2025-09-02 PROCEDURE — 76801 OB US < 14 WKS SINGLE FETUS: CPT

## 2025-09-03 ENCOUNTER — HOSPITAL ENCOUNTER (OUTPATIENT)
Dept: ULTRASOUND IMAGING | Facility: CLINIC | Age: 30
Discharge: HOME | End: 2025-09-03
Attending: STUDENT IN AN ORGANIZED HEALTH CARE EDUCATION/TRAINING PROGRAM
Payer: MEDICAID

## 2025-09-03 ENCOUNTER — OFFICE VISIT (OUTPATIENT)
Dept: MATERNAL FETAL MEDICINE | Facility: CLINIC | Age: 30
End: 2025-09-03
Attending: STUDENT IN AN ORGANIZED HEALTH CARE EDUCATION/TRAINING PROGRAM
Payer: MEDICAID

## 2025-09-03 DIAGNOSIS — Z36.0 ENCOUNTER FOR ANTENATAL SCREENING FOR CHROMOSOMAL ANOMALIES: ICD-10-CM

## 2025-09-03 DIAGNOSIS — Z36.9 ENCOUNTER FOR ANTENATAL SCREENING: ICD-10-CM

## 2025-09-03 DIAGNOSIS — Z34.01 ENCOUNTER FOR SUPERVISION OF NORMAL FIRST PREGNANCY IN FIRST TRIMESTER: ICD-10-CM

## 2025-09-03 DIAGNOSIS — Z31.5 ENCOUNTER FOR PROCREATIVE GENETIC COUNSELING: ICD-10-CM

## 2025-09-03 DIAGNOSIS — Z36.82 ENCOUNTER FOR (NT) NUCHAL TRANSLUCENCY SCAN: Primary | ICD-10-CM

## 2025-09-03 DIAGNOSIS — O26.90 PREGNANCY RELATED CONDITION, ANTEPARTUM: Primary | ICD-10-CM

## 2025-09-03 DIAGNOSIS — O26.90 PREGNANCY RELATED CONDITION, ANTEPARTUM: ICD-10-CM

## 2025-09-03 PROCEDURE — 36415 COLL VENOUS BLD VENIPUNCTURE: CPT | Performed by: STUDENT IN AN ORGANIZED HEALTH CARE EDUCATION/TRAINING PROGRAM

## 2025-09-03 PROCEDURE — 96041 GENETIC COUNSELING SVC EA 30: CPT

## 2025-09-03 PROCEDURE — 76813 OB US NUCHAL MEAS 1 GEST: CPT
